# Patient Record
Sex: FEMALE | Race: OTHER | NOT HISPANIC OR LATINO | URBAN - METROPOLITAN AREA
[De-identification: names, ages, dates, MRNs, and addresses within clinical notes are randomized per-mention and may not be internally consistent; named-entity substitution may affect disease eponyms.]

---

## 2019-06-20 ENCOUNTER — EMERGENCY (EMERGENCY)
Facility: HOSPITAL | Age: 49
LOS: 1 days | Discharge: HOME | End: 2019-06-20
Admitting: EMERGENCY MEDICINE
Payer: MEDICARE

## 2019-06-20 VITALS
TEMPERATURE: 99 F | DIASTOLIC BLOOD PRESSURE: 83 MMHG | HEART RATE: 90 BPM | RESPIRATION RATE: 17 BRPM | OXYGEN SATURATION: 99 % | SYSTOLIC BLOOD PRESSURE: 118 MMHG

## 2019-06-20 DIAGNOSIS — M25.571 PAIN IN RIGHT ANKLE AND JOINTS OF RIGHT FOOT: ICD-10-CM

## 2019-06-20 DIAGNOSIS — M79.89 OTHER SPECIFIED SOFT TISSUE DISORDERS: ICD-10-CM

## 2019-06-20 PROCEDURE — 99284 EMERGENCY DEPT VISIT MOD MDM: CPT

## 2019-06-20 PROCEDURE — 73610 X-RAY EXAM OF ANKLE: CPT | Mod: 26,RT

## 2019-06-20 PROCEDURE — 93971 EXTREMITY STUDY: CPT | Mod: 26,RT

## 2019-06-20 RX ADMIN — Medication 100 MILLIGRAM(S): at 17:25

## 2019-06-20 NOTE — ED PROVIDER NOTE - PHYSICAL EXAMINATION
CONSTITUTIONAL: Well-developed; well-nourished; in no acute distress.  	HEAD: Normocephalic; atraumatic.  	EYES: PERRL, EOM intact; conjunctiva and sclera clear.  	ENT: No nasal discharge; airway clear.  no tonsillar swelling or exudates, uvula midline, airway patent  	NECK: Supple; non tender.  	CARD: S1, S2 normal; no murmurs, gallops, or rubs. Regular rate and rhythm.  	RESP: No wheezes, rales or rhonchi.                Bilateral lower extremities: +R ankle swelling > L ankle swelling, minimal bilateral lower extremity erythema, normal temperature. no discharge or crepitus. +mild tenderness around right ankle. full ROM joints. distal pulses intact. no sensory or motor deficits. no calf tenderness bilaterally. ambulatory  	NEURO: Alert, oriented. Grossly unremarkable.  PSYCH: Cooperative, appropriate.

## 2019-06-20 NOTE — ED ADULT NURSE NOTE - CHIEF COMPLAINT QUOTE
Right ankle pain x several days. Pt reports she went to [facility] ER for same pain and was told she has athletes foot.

## 2019-06-20 NOTE — ED ADULT NURSE NOTE - NSIMPLEMENTINTERV_GEN_ALL_ED
Implemented All Universal Safety Interventions:  [place] to call system. Call bell, personal items and telephone within reach. Instruct patient to call for assistance. Room bathroom lighting operational. Non-slip footwear when patient is off stretcher. Physically safe environment: no spills, clutter or unnecessary equipment. Stretcher in lowest position, wheels locked, appropriate side rails in place.

## 2019-06-20 NOTE — ED PROVIDER NOTE - CLINICAL SUMMARY MEDICAL DECISION MAKING FREE TEXT BOX
R ankle swelling, chronic leg swelling at baseline, nvi, US duplex neg for DVT, xrays neg for fx or dislocation, will rx doxycycline for possible cellulitis, advised leg elevation, close follow up with pmd, return precautions discussed

## 2019-06-20 NOTE — ED PROVIDER NOTE - CARE PROVIDER_API CALL
Víctor Fleming (DO)  Medicine  Dept Director  [address] [place] [address], [place] [address]  Phone: [phone]  Fax: [phone]  Follow Up Time:

## 2019-06-20 NOTE — ED ADULT TRIAGE NOTE - CHIEF COMPLAINT QUOTE
Right ankle pain x several days. Pt reports she went to [facility] ER for same pain and was told she has athletes foot.

## 2019-06-20 NOTE — ED PROVIDER NOTE - NSFOLLOWUPINSTRUCTIONS_ED_ALL_ED_FT
Take Doxycycline as prescribed  Leg elevation  Take Ibuprofen 600mg every 6-8 hours as needed for pain, take with food, and in addition you may take Tylenol 500 mg every 6-8 hours as needed for pain  Rest. Cool compresses.     Follow up with your doctor within 2-3 days or earlier    RETURN TO THE EMERGENCY DEPARTMENT FOR WORSENING REDNESS, SWELLING, DISCHARGE, PUS, FEVER OR ANY CONCERNING OR WORSENING SYMPTOMS.

## 2019-06-20 NOTE — ED PROVIDER NOTE - OBJECTIVE STATEMENT
48 yo female with hx bipolar, chronic lower leg swelling as per patient, presents c/o "more swelling than usual" to right ankle x 1 week. denies trauma or fall. states she was seen at [facility] and was told she had athletes foot as per patient. denies fever, IVDA. ambulatory. concerned she may have cellulitis as well.